# Patient Record
Sex: MALE | Race: AMERICAN INDIAN OR ALASKA NATIVE | Employment: UNEMPLOYED | URBAN - METROPOLITAN AREA
[De-identification: names, ages, dates, MRNs, and addresses within clinical notes are randomized per-mention and may not be internally consistent; named-entity substitution may affect disease eponyms.]

---

## 2018-02-23 ENCOUNTER — OFFICE VISIT (OUTPATIENT)
Dept: ORTHOPEDICS | Facility: CLINIC | Age: 28
End: 2018-02-23
Payer: COMMERCIAL

## 2018-02-23 ENCOUNTER — RADIANT APPOINTMENT (OUTPATIENT)
Dept: GENERAL RADIOLOGY | Facility: CLINIC | Age: 28
End: 2018-02-23
Attending: PEDIATRICS
Payer: COMMERCIAL

## 2018-02-23 ENCOUNTER — MEDICAL CORRESPONDENCE (OUTPATIENT)
Dept: HEALTH INFORMATION MANAGEMENT | Facility: CLINIC | Age: 28
End: 2018-02-23

## 2018-02-23 VITALS
SYSTOLIC BLOOD PRESSURE: 118 MMHG | WEIGHT: 160 LBS | DIASTOLIC BLOOD PRESSURE: 68 MMHG | BODY MASS INDEX: 25.11 KG/M2 | HEIGHT: 67 IN

## 2018-02-23 DIAGNOSIS — S82.62XA CLOSED AVULSION FRACTURE OF LATERAL MALLEOLUS OF LEFT FIBULA, INITIAL ENCOUNTER: Primary | ICD-10-CM

## 2018-02-23 DIAGNOSIS — S99.912A INJURY OF LEFT ANKLE, INITIAL ENCOUNTER: ICD-10-CM

## 2018-02-23 PROCEDURE — 99204 OFFICE O/P NEW MOD 45 MIN: CPT | Performed by: PEDIATRICS

## 2018-02-23 PROCEDURE — 73610 X-RAY EXAM OF ANKLE: CPT | Mod: LT

## 2018-02-23 NOTE — PROGRESS NOTES
"Sports Medicine Clinic Visit    PCP: Keyanna Yin Estela is a 28 year old male who is seen  as a self referral presenting with left ankle injury.    Injury: Landed funny jumping on a trampoline at a trampoline park.  Reports an inversion injury.    Location of Pain: left ankle, lateral  Duration of Pain: 2/17/18  Rating of Pain at worst: 7/10  Rating of Pain Currently: 2/10  Symptoms are better with: Ice, Rest and Elevation  Symptoms are worse with: weight bearing  Additional Features:   Positive: swelling and bruising, numbness into the toes   Negative: popping, grinding, catching, locking, instability, paresthesias, weakness, pain in other joints and systemic symptoms  Other evaluation and/or treatments so far consists of: Ice, Rest and Elevation  Prior History of related problems: None    Social History:     Review of Systems  Skin: yes bruising, yes swelling  Musculoskeletal: as above  Neurologic: occasional numbness, paresthesias  Remainder of review of systems is negative including constitutional, CV, pulmonary, GI, except as noted in HPI or medical history.    Patient's current problem list, past medical and surgical history, and family history were reviewed.    Patient Active Problem List   Diagnosis     CARDIOVASCULAR SCREENING; LDL GOAL LESS THAN 160     Herpes simplex type 2 infection     No past medical history on file.  No past surgical history on file.  No family history on file.      Objective  /68 (BP Location: Left arm, Patient Position: Sitting, Cuff Size: Adult Regular)  Ht 5' 7\" (1.702 m)  Wt 160 lb (72.6 kg)  BMI 25.06 kg/m2    GENERAL APPEARANCE: healthy, alert and no distress   GAIT: antalgic  SKIN: no suspicious lesions or rashes  HEENT: Sclera clear, anicteric  CV: good peripheral pulses  RESP: Breathing not labored  NEURO: Normal strength and tone, mentation intact and speech normal  PSYCH:  mentation appears normal and affect " normal/bright    Bilateral Foot and Ankle Exam:  Inspection:       ecchymosis noted lateral ankle       edema noted throughout lateral ankle    Foot inspection:       no deformity noted    Tender:       lateral malleolus  left       Mild medial malleolus    Non-Tender:       remainder of ankle and foot bilateral    ROM:        limited ankle dorsiflexion, plantarflexion, inversion and eversion    Strength:       ankle dorsiflexion 3/5 left       plantarflexion 3/5 left       inversion 3/5 left       eversion 3/5 left    Gait:       antalgic gait    Neurovascular:       2+ peripheral pulses bilaterally and brisk capillary refill       sensation grossly intact    Radiology  I ordered, visualized and reviewed these images with the patient  3 XR views of left ankle reviewed: small lateral malleolar avulsion fracture, mortise intact, no significant degenerative change  - will follow official read    Assessment:  1. Closed avulsion fracture of lateral malleolus of left fibula, initial encounter      Recommend supportive care including cam walking boot, crutches, rest, ice, elevation. Follow up in 2 weeks for repeat exam. Would consider further imaging at that point pending clinical course. Patient will likely wean to ankle brace and need physical therapy at some point.     Plan:  - Today's Plan of Care:  Work Restrictions  Medical Equipment: walking boot and scooter    Follow Up: 2 weeks with re-xray    Concerning signs and symptoms were reviewed.  The patient expressed understanding of this management plan and all questions were answered at this time.    Maria Teresa Delacruz MD [facility]  Primary Care Sports Medicine  [facility]

## 2018-02-23 NOTE — LETTER
"    2/23/2018         RE: Zach Palmer  [address]  [place] [place] [address]        Dear Colleague,    Thank you for referring your patient, aZch Palmer, to the [facility]. Please see a copy of my visit note below.    Sports Medicine Clinic Visit    PCP: Keyanna Yin    Zach Palmer is a 28 year old male who is seen  as a self referral presenting with left ankle injury.    Injury: Landed funny jumping on a trampoline at a trampoline park.  Reports an inversion injury.    Location of Pain: left ankle, lateral  Duration of Pain: 2/17/18  Rating of Pain at worst: 7/10  Rating of Pain Currently: 2/10  Symptoms are better with: Ice, Rest and Elevation  Symptoms are worse with: weight bearing  Additional Features:   Positive: swelling and bruising, numbness into the toes   Negative: popping, grinding, catching, locking, instability, paresthesias, weakness, pain in other joints and systemic symptoms  Other evaluation and/or treatments so far consists of: Ice, Rest and Elevation  Prior History of related problems: None    Social History:     Review of Systems  Skin: yes bruising, yes swelling  Musculoskeletal: as above  Neurologic: occasional numbness, paresthesias  Remainder of review of systems is negative including constitutional, CV, pulmonary, GI, except as noted in HPI or medical history.    Patient's current problem list, past medical and surgical history, and family history were reviewed.    Patient Active Problem List   Diagnosis     CARDIOVASCULAR SCREENING; LDL GOAL LESS THAN 160     Herpes simplex type 2 infection     No past medical history on file.  No past surgical history on file.  No family history on file.      Objective  /68 (BP Location: Left arm, Patient Position: Sitting, Cuff Size: Adult Regular)  Ht 5' 7\" (1.702 m)  Wt 160 lb (72.6 kg)  BMI 25.06 kg/m2    GENERAL APPEARANCE: healthy, alert and no distress   GAIT: " antalgic  SKIN: no suspicious lesions or rashes  HEENT: Sclera clear, anicteric  CV: good peripheral pulses  RESP: Breathing not labored  NEURO: Normal strength and tone, mentation intact and speech normal  PSYCH:  mentation appears normal and affect normal/bright    Bilateral Foot and Ankle Exam:  Inspection:       ecchymosis noted lateral ankle       edema noted throughout lateral ankle    Foot inspection:       no deformity noted    Tender:       lateral malleolus  left       Mild medial malleolus    Non-Tender:       remainder of ankle and foot bilateral    ROM:        limited ankle dorsiflexion, plantarflexion, inversion and eversion    Strength:       ankle dorsiflexion 3/5 left       plantarflexion 3/5 left       inversion 3/5 left       eversion 3/5 left    Gait:       antalgic gait    Neurovascular:       2+ peripheral pulses bilaterally and brisk capillary refill       sensation grossly intact    Radiology  I ordered, visualized and reviewed these images with the patient  3 XR views of left ankle reviewed: small lateral malleolar avulsion fracture, mortise intact, no significant degenerative change  - will follow official read    Assessment:  1. Closed avulsion fracture of lateral malleolus of left fibula, initial encounter      Recommend supportive care including cam walking boot, crutches, rest, ice, elevation. Follow up in 2 weeks for repeat exam. Would consider further imaging at that point pending clinical course. Patient will likely wean to ankle brace and need physical therapy at some point.     Plan:  - Today's Plan of Care:  Work Restrictions  Medical Equipment: walking boot and scooter    Follow Up: 2 weeks with re-xray    Concerning signs and symptoms were reviewed.  The patient expressed understanding of this management plan and all questions were answered at this time.    Maria Teresa Delacruz MD [facility]  Primary Care Sports Medicine  [facility]    Again, thank you for allowing  me to participate in the care of your patient.        Sincerely,        Maria Teresa Delacruz MD

## 2018-02-23 NOTE — PATIENT INSTRUCTIONS
Plan:  - Today's Plan of Care:  Work Restrictions  Medical Equipment: walking boot and scooter    Follow Up: 2 weeks with re-xray

## 2018-02-23 NOTE — LETTER
February 23, 2018      Zach Palmer  [address] [place] [facility] [place] [address]        To Whom It May Concern:    Zach Palmer was seen in our clinic today for a left ankle injury. Please excuse him from 2/19/18 until today 2/23/18 due to this injury. He may return to work with the following: allow to wear boot and use scooter at work. Limit weight bearing and allow for elevation of leg/ankle.     Sincerely,        Maria Teresa Delacruz MD

## 2018-02-23 NOTE — MR AVS SNAPSHOT
"              After Visit Summary   2/23/2018    Zach Palmer    MRN: [id number]           Patient Information     Date Of Birth          1990        Visit Information        Provider Department      2/23/2018 8:00 AM Maria Teresa Delacruz MD [facility]        Today's Diagnoses     Closed avulsion fracture of lateral malleolus of left fibula, initial encounter    -  1      Care Instructions    Plan:  - Today's Plan of Care:  Work Restrictions  Medical Equipment: walking boot and scooter    Follow Up: 2 weeks with re-xray              Follow-ups after your visit        Who to contact     If you have questions or need follow up information about today's clinic visit or your schedule please contact [facility] directly at [phone].  Normal or non-critical lab and imaging results will be communicated to you by MyChart, letter or phone within 4 business days after the clinic has received the results. If you do not hear from us within 7 days, please contact the clinic through mValenthart or phone. If you have a critical or abnormal lab result, we will notify you by phone as soon as possible.  Submit refill requests through [employer] or call your pharmacy and they will forward the refill request to us. Please allow 3 business days for your refill to be completed.          Additional Information About Your Visit        mValenthart Information     [employer] Party lets you send messages to your doctor, view your test results, renew your prescriptions, schedule appointments and more. To sign up, go to [email or web address] . Click on \"Log in\" on the left side of the screen, which will take you to the Welcome page. Then click on \"Sign up Now\" on the right side of the page.     You will be asked to enter the access code listed below, as well as some personal information. Please follow the directions to create your username and password.     Your access code is: " "[email or web address]  Expires: 2018  9:13 AM     Your access code will  in 90 days. If you need help or a new code, please call your [place] clinic or [phone].        Care EveryWhere ID     This is your Care EveryWhere ID. This could be used by other organizations to access your [place] medical records  [id number]        Your Vitals Were     Height BMI (Body Mass Index)                5' 7\" (1.702 m) 25.06 kg/m2           Blood Pressure from Last 3 Encounters:   18 118/68   05/24/15 120/60   12 127/65    Weight from Last 3 Encounters:   18 160 lb (72.6 kg)   12 146 lb 9.6 oz (66.5 kg)   11 143 lb (64.9 kg)                 Today's Medication Changes          These changes are accurate as of 18  9:13 AM.  If you have any questions, ask your nurse or doctor.               Start taking these medicines.        Dose/Directions    order for DME   Used for:  Closed avulsion fracture of lateral malleolus of left fibula, initial encounter   Started by:  Maria Teresa Delacruz MD        Equipment being ordered: Knee Scooter   Quantity:  1 Device   Refills:  0            Where to get your medicines      Some of these will need a paper prescription and others can be bought over the counter.  Ask your nurse if you have questions.     Bring a paper prescription for each of these medications     order for DME                Primary Care Provider Office Phone # Fax #    Keyanna Emi Yin [phone] [address] [facility] [address]        Equal Access to Services     [facility]: Hadii aad ku hadasho Soomaali, waaxda luqadaha, qaybta kaalmada adeegyada, corina claros. So [facility] [phone].    ATENCIÓN: Si habla español, tiene a acevedo disposición servicios gratuitos de asistencia lingüística. Llame al [phone].    We comply with applicable federal civil rights laws and [place] laws. We do not discriminate on the basis of race, " color, national origin, age, disability, sex, sexual orientation, or gender identity.            Thank you!     Thank you for choosing [facility]  for your care. Our goal is always to provide you with excellent care. Hearing back from our patients is one way we can continue to improve our services. Please take a few minutes to complete the written survey that you may receive in the mail after your visit with us. Thank you!             Your Updated Medication List - Protect others around you: Learn how to safely use, store and throw away your medicines at www.disposemymeds.org.          This list is accurate as of 2/23/18  9:13 AM.  Always use your most recent med list.                   Brand Name Dispense Instructions for use Diagnosis    acyclovir 5 % ointment    ZOVIRAX    15 g    Apply  topically 6 times daily.    Herpes simplex type 2 infection       acyclovir 800 MG tablet    ZOVIRAX    35 tablet    Take 1 tablet by mouth 5 times daily.    Herpes simplex type 2 infection       NO ACTIVE MEDICATIONS      .    Screening examination for venereal disease       order for DME     1 Device    Equipment being ordered: Knee Scooter    Closed avulsion fracture of lateral malleolus of left fibula, initial encounter

## 2018-07-09 ENCOUNTER — OFFICE VISIT (OUTPATIENT)
Dept: FAMILY MEDICINE | Facility: CLINIC | Age: 28
End: 2018-07-09
Payer: COMMERCIAL

## 2018-07-09 VITALS
OXYGEN SATURATION: 98 % | DIASTOLIC BLOOD PRESSURE: 74 MMHG | HEART RATE: 74 BPM | BODY MASS INDEX: 25.46 KG/M2 | HEIGHT: 68 IN | SYSTOLIC BLOOD PRESSURE: 120 MMHG | WEIGHT: 168 LBS | TEMPERATURE: 98.3 F

## 2018-07-09 DIAGNOSIS — B00.9 HERPES SIMPLEX TYPE 2 INFECTION: ICD-10-CM

## 2018-07-09 PROCEDURE — 99213 OFFICE O/P EST LOW 20 MIN: CPT | Performed by: FAMILY MEDICINE

## 2018-07-09 RX ORDER — ACYCLOVIR 800 MG/1
800 TABLET ORAL
Qty: 35 TABLET | Refills: 0 | Status: CANCELLED | OUTPATIENT
Start: 2018-07-09

## 2018-07-09 RX ORDER — ACYCLOVIR 400 MG/1
400 TABLET ORAL 3 TIMES DAILY
Qty: 15 TABLET | Refills: 5 | Status: SHIPPED | OUTPATIENT
Start: 2018-07-09

## 2018-07-09 NOTE — PATIENT INSTRUCTIONS
Thank you for choosing [facility].  You may be receiving a survey in the mail from Haroon Godwin regarding your visit today.  Please take a few minutes to complete and return the survey to let us know how we are doing.      If you have questions or concerns, please contact us via SecureMedia or you can contact your care team at [phone].    Our Clinic hours are:  Monday 6:40 am  to 7:00 pm  Tuesday -Friday 6:40 am to 5:00 pm    The [place] outpatient lab hours are:  Monday - Friday 6:10 am to 4:45 pm  Saturdays 7:00 am to 11:00 am  Appointments are required, call [phone]    If you have clinical questions after hours or would like to schedule an appointment,  call the clinic at [phone].

## 2018-07-09 NOTE — MR AVS SNAPSHOT
After Visit Summary   7/9/2018    Zach Palmer    MRN: [id number]           Patient Information     Date Of Birth          1990        Visit Information        Provider Department      7/9/2018 2:20 PM Porsha Rinaldi MD [facility]        Today's Diagnoses     Herpes simplex type 2 infection          Care Instructions          Thank you for choosing [facility].  You may be receiving a survey in the mail from [facility] regarding your visit today.  Please take a few minutes to complete and return the survey to let us know how we are doing.      If you have questions or concerns, please contact us via ClickFox or you can contact your care team at [phone].    Our Clinic hours are:  Monday 6:40 am  to 7:00 pm  Tuesday -Friday 6:40 am to 5:00 pm    The [place] outpatient lab hours are:  Monday - Friday 6:10 am to 4:45 pm  Saturdays 7:00 am to 11:00 am  Appointments are required, call [phone]    If you have clinical questions after hours or would like to schedule an appointment,  call the clinic at [phone].          Follow-ups after your visit        Who to contact     If you have questions or need follow up information about today's clinic visit or your schedule please contact [facility] directly at [phone].  Normal or non-critical lab and imaging results will be communicated to you by [employer], letter or phone within 4 business days after the clinic has received the results. If you do not hear from us within 7 days, please contact the clinic through [employer] or phone. If you have a critical or abnormal lab result, we will notify you by phone as soon as possible.  Submit refill requests through ClickFox or call your pharmacy and they will forward the refill request to us. Please allow 3 business days for your refill to be completed.          Additional Information About Your Visit        Care EveryWhere ID     This is your Care  "EveryWhere ID. This could be used by other organizations to access your [facility] medical records  [id number]        Your Vitals Were     Pulse Temperature Height Pulse Oximetry BMI (Body Mass Index)       74 98.3  F (36.8  C) (Tympanic) 5' 7.75\" (1.721 m) 98% 25.73 kg/m2        Blood Pressure from Last 3 Encounters:   07/09/18 120/74   02/23/18 118/68   05/24/15 120/60    Weight from Last 3 Encounters:   07/09/18 168 lb (76.2 kg)   02/23/18 160 lb (72.6 kg)   01/16/12 146 lb 9.6 oz (66.5 kg)              Today, you had the following     No orders found for display         Today's Medication Changes          These changes are accurate as of 7/9/18  2:43 PM.  If you have any questions, ask your nurse or doctor.               These medicines have changed or have updated prescriptions.        Dose/Directions    * acyclovir 800 MG tablet   Commonly known as:  ZOVIRAX   This may have changed:  Another medication with the same name was added. Make sure you understand how and when to take each.   Used for:  Herpes simplex type 2 infection   Changed by:  Porsha Rinaldi MD        Dose:  800 mg   Take 1 tablet by mouth 5 times daily.   Quantity:  35 tablet   Refills:  0       * acyclovir 400 MG tablet   Commonly known as:  ZOVIRAX   This may have changed:  You were already taking a medication with the same name, and this prescription was added. Make sure you understand how and when to take each.   Used for:  Herpes simplex type 2 infection   Changed by:  Porsha Rinaldi MD        Dose:  400 mg   Take 1 tablet (400 mg) by mouth 3 times daily   Quantity:  15 tablet   Refills:  5       * Notice:  This list has 2 medication(s) that are the same as other medications prescribed for you. Read the directions carefully, and ask your doctor or other care provider to review them with you.         Where to get your medicines      These medications were sent to [facility] [place], [place] - [address] [facility]  [address] [facility] [address]     Phone:  [phone]     acyclovir 400 MG tablet                Primary Care Provider Office Phone # Fax #    Keyanna YinLIANG [phone] [address] [facility] [address]        Equal Access to Services     YESENIA GARCÍA : Hadii aad ku hadasho Soomaali, waaxda luqadaha, qaybta kaalmada adeegyada, waxay idiin hayaan adeeg kharash la'fabianan . So [facility] [phone].    ATENCIÓN: Si habla español, tiene a acevedo disposición servicios gratuitos de asistencia lingüística. Kyleame al [phone].    We comply with applicable federal civil rights laws and [place] laws. We do not discriminate on the basis of race, color, national origin, age, disability, sex, sexual orientation, or gender identity.            Thank you!     Thank you for choosing [facility]  for your care. Our goal is always to provide you with excellent care. Hearing back from our patients is one way we can continue to improve our services. Please take a few minutes to complete the written survey that you may receive in the mail after your visit with us. Thank you!             Your Updated Medication List - Protect others around you: Learn how to safely use, store and throw away your medicines at www.disposemymeds.org.          This list is accurate as of 7/9/18  2:43 PM.  Always use your most recent med list.                   Brand Name Dispense Instructions for use Diagnosis    acyclovir 5 % ointment    ZOVIRAX    15 g    Apply  topically 6 times daily.    Herpes simplex type 2 infection       * acyclovir 800 MG tablet    ZOVIRAX    35 tablet    Take 1 tablet by mouth 5 times daily.    Herpes simplex type 2 infection       * acyclovir 400 MG tablet    ZOVIRAX    15 tablet    Take 1 tablet (400 mg) by mouth 3 times daily    Herpes simplex type 2 infection       NO ACTIVE MEDICATIONS      .    Screening examination for venereal disease       * order for DME      1 Device    Equipment being ordered: Knee Scooter    Closed avulsion fracture of lateral malleolus of left fibula, initial encounter       * order for DME     1 Device    Equipment being ordered: Walking Boot - medium    Closed avulsion fracture of lateral malleolus of left fibula, initial encounter       * Notice:  This list has 4 medication(s) that are the same as other medications prescribed for you. Read the directions carefully, and ask your doctor or other care provider to review them with you.

## 2018-07-09 NOTE — PROGRESS NOTES
"  SUBJECTIVE:   Zach Palmer is a 28 year old male who presents to clinic today for the following health issues:    Patient needs a refill on acyclovir for herpes   Medication Followup of acyclovir     Taking Medication as prescribed: yes    Side Effects:  None    Medication Helping Symptoms:  yes       Patient here for refills on acyclovir. He has history of genital herpes and he has flares at least once a year     Problem list and histories reviewed & adjusted, as indicated.  Additional history: as documented    Patient Active Problem List   Diagnosis     CARDIOVASCULAR SCREENING; LDL GOAL LESS THAN 160     Herpes simplex type 2 infection     History reviewed. No pertinent surgical history.    Social History   Substance Use Topics     Smoking status: Never Smoker     Smokeless tobacco: Never Used     Alcohol use No     History reviewed. No pertinent family history.      Current Outpatient Prescriptions   Medication Sig Dispense Refill     acyclovir (ZOVIRAX) 400 MG tablet Take 1 tablet (400 mg) by mouth 3 times daily 15 tablet 5     acyclovir (ZOVIRAX) 800 MG tablet Take 1 tablet by mouth 5 times daily. 35 tablet 0     acyclovir (ZOVIRAX) 5 % ointment Apply  topically 6 times daily. (Patient not taking: Reported on 2/23/2018) 15 g 3     NO ACTIVE MEDICATIONS .       order for DME Equipment being ordered: Knee Scooter 1 Device 0     order for DME Equipment being ordered: Walking Boot - medium 1 Device 0     No Known Allergies    Reviewed and updated as needed this visit by clinical staff  Tobacco  Allergies  Meds  Med Hx  Surg Hx  Fam Hx  Soc Hx      Reviewed and updated as needed this visit by Provider         ROS:  Constitutional, HEENT, cardiovascular, pulmonary, gi and gu systems are negative, except as otherwise noted.    OBJECTIVE:     /74 (BP Location: Left arm, Cuff Size: Adult Regular)  Pulse 74  Temp 98.3  F (36.8  C) (Tympanic)  Ht 5' 7.75\" (1.721 m)  Wt 168 lb (76.2 kg)  SpO2 98%  " BMI 25.73 kg/m2  Body mass index is 25.73 kg/(m^2).  GENERAL: healthy, alert and no distress  NECK: no adenopathy, no asymmetry, masses, or scars and thyroid normal to palpation  RESP: lungs clear to auscultation - no rales, rhonchi or wheezes  CV: regular rate and rhythm, normal S1 S2, no S3 or S4, no murmur, click or rub, no peripheral edema and peripheral pulses strong  MS: no gross musculoskeletal defects noted, no edema    Diagnostic Test Results:  none     ASSESSMENT/PLAN:       1. Herpes simplex type 2 infection  Medication refilled.   - acyclovir (ZOVIRAX) 400 MG tablet; Take 1 tablet (400 mg) by mouth 3 times daily  Dispense: 15 tablet; Refill: 5    FUTURE APPOINTMENTS:       - Follow-up visit as needed.    Porsha Rinaldi MD  [facility]

## 2020-01-19 DIAGNOSIS — B00.9 HERPES SIMPLEX TYPE 2 INFECTION: ICD-10-CM

## 2020-01-20 NOTE — TELEPHONE ENCOUNTER
"Requested Prescriptions   Pending Prescriptions Disp Refills     acyclovir (ZOVIRAX) 400 MG tablet [Pharmacy Med Name: ACYCLOVIR 400MG  Last Written Prescription Date:  07/09/18  Last Fill Quantity: 15,  # refills: 5   Last office visit: 7/9/2018 with prescribing provider:  Porsha Rinaldi   Future Office Visit:     TABS] 15 tablet 5     Sig: TAKE ONE TABLET BY MOUTH THREE TIMES A DAY       Antivirals for Herpes Protocol Failed - 1/19/2020 11:00 AM        Failed - Recent (12 mo) or future (30 days) visit within the authorizing provider's specialty     Patient has had an office visit with the authorizing provider or a provider within the authorizing providers department within the previous 12 mos or has a future within next 30 days. See \"Patient Info\" tab in inbasket, or \"Choose Columns\" in Meds & Orders section of the refill encounter.          Failed - Normal serum creatinine on file in past 12 months     No lab results found.          Passed - Patient is age 12 or older        Passed - Medication is active on med list          "

## 2020-01-21 RX ORDER — ACYCLOVIR 400 MG/1
TABLET ORAL
Qty: 15 TABLET | Refills: 5 | OUTPATIENT
Start: 2020-01-21